# Patient Record
Sex: FEMALE | Race: WHITE | Employment: UNEMPLOYED | URBAN - METROPOLITAN AREA
[De-identification: names, ages, dates, MRNs, and addresses within clinical notes are randomized per-mention and may not be internally consistent; named-entity substitution may affect disease eponyms.]

---

## 2017-01-06 ENCOUNTER — HOSPITAL ENCOUNTER (OUTPATIENT)
Dept: PHARMACY | Age: 82
Discharge: HOME | End: 2017-01-06
Payer: MEDICARE

## 2017-01-06 DIAGNOSIS — I82.91 CHRONIC EMBOLISM AND THROMBOSIS OF UNSPECIFIED VEIN: ICD-10-CM

## 2017-01-06 LAB
INR BLD: 3.2
PROTIME: 39 SECONDS

## 2017-01-06 PROCEDURE — 85610 PROTHROMBIN TIME: CPT | Performed by: PHARMACIST

## 2017-01-06 PROCEDURE — G0463 HOSPITAL OUTPT CLINIC VISIT: HCPCS | Performed by: PHARMACIST

## 2017-02-17 ENCOUNTER — HOSPITAL ENCOUNTER (OUTPATIENT)
Dept: PHARMACY | Age: 82
Discharge: HOME | End: 2017-02-17
Payer: MEDICARE

## 2017-02-17 DIAGNOSIS — I82.91 CHRONIC EMBOLISM AND THROMBOSIS OF UNSPECIFIED VEIN: ICD-10-CM

## 2017-02-17 LAB
INR BLD: 2.6
PROTIME: 30.7 SECONDS

## 2017-02-17 PROCEDURE — G0463 HOSPITAL OUTPT CLINIC VISIT: HCPCS

## 2017-02-17 PROCEDURE — 85610 PROTHROMBIN TIME: CPT

## 2017-03-31 ENCOUNTER — HOSPITAL ENCOUNTER (OUTPATIENT)
Dept: PHARMACY | Age: 82
Discharge: HOME | End: 2017-03-31
Payer: MEDICARE

## 2017-03-31 DIAGNOSIS — I82.91 CHRONIC EMBOLISM AND THROMBOSIS OF UNSPECIFIED VEIN: ICD-10-CM

## 2017-03-31 LAB
INR BLD: 3
PROTIME: 35.7 SECONDS

## 2017-03-31 PROCEDURE — 85610 PROTHROMBIN TIME: CPT

## 2017-03-31 PROCEDURE — G0463 HOSPITAL OUTPT CLINIC VISIT: HCPCS

## 2017-05-12 ENCOUNTER — HOSPITAL ENCOUNTER (OUTPATIENT)
Dept: PHARMACY | Age: 82
Discharge: HOME | End: 2017-05-12
Payer: MEDICARE

## 2017-05-12 DIAGNOSIS — I82.91 CHRONIC EMBOLISM AND THROMBOSIS OF UNSPECIFIED VEIN: ICD-10-CM

## 2017-05-12 LAB
INR BLD: 2.9
PROTIME: 34.5 SECONDS

## 2017-05-12 PROCEDURE — 99211 OFF/OP EST MAY X REQ PHY/QHP: CPT

## 2017-05-12 PROCEDURE — 85610 PROTHROMBIN TIME: CPT

## 2017-06-22 ENCOUNTER — HOSPITAL ENCOUNTER (OUTPATIENT)
Dept: PHARMACY | Age: 82
Setting detail: THERAPIES SERIES
Discharge: HOME | End: 2017-06-22
Payer: MEDICARE

## 2017-06-22 DIAGNOSIS — I82.91 CHRONIC EMBOLISM AND THROMBOSIS OF UNSPECIFIED VEIN: ICD-10-CM

## 2017-06-22 LAB
INR BLD: 1.9
PROTIME: 23.1 SECONDS

## 2017-06-22 PROCEDURE — 85610 PROTHROMBIN TIME: CPT

## 2017-06-22 PROCEDURE — 99211 OFF/OP EST MAY X REQ PHY/QHP: CPT

## 2017-08-03 ENCOUNTER — HOSPITAL ENCOUNTER (OUTPATIENT)
Dept: PHARMACY | Age: 82
Setting detail: THERAPIES SERIES
Discharge: HOME | End: 2017-08-03
Payer: MEDICARE

## 2017-08-03 DIAGNOSIS — I82.91 CHRONIC EMBOLISM AND THROMBOSIS OF UNSPECIFIED VEIN: ICD-10-CM

## 2017-08-03 LAB
INR BLD: 2.9
PROTIME: 34.8 SECONDS

## 2017-08-03 PROCEDURE — 85610 PROTHROMBIN TIME: CPT | Performed by: PHARMACIST

## 2017-08-03 PROCEDURE — 99211 OFF/OP EST MAY X REQ PHY/QHP: CPT | Performed by: PHARMACIST

## 2017-08-22 ENCOUNTER — HOSPITAL ENCOUNTER (OUTPATIENT)
Dept: PHARMACY | Age: 82
Setting detail: THERAPIES SERIES
Discharge: HOME | End: 2017-08-22
Payer: MEDICARE

## 2017-08-22 DIAGNOSIS — I82.91 CHRONIC EMBOLISM AND THROMBOSIS OF UNSPECIFIED VEIN: ICD-10-CM

## 2017-08-22 LAB
INR BLD: 4.1
PROTIME: 49.5 SECONDS

## 2017-08-22 PROCEDURE — 85610 PROTHROMBIN TIME: CPT

## 2017-08-22 PROCEDURE — 99211 OFF/OP EST MAY X REQ PHY/QHP: CPT

## 2017-09-12 ENCOUNTER — TELEPHONE (OUTPATIENT)
Dept: PHARMACY | Age: 82
End: 2017-09-12

## 2017-09-12 ENCOUNTER — HOSPITAL ENCOUNTER (OUTPATIENT)
Dept: PHARMACY | Age: 82
Setting detail: THERAPIES SERIES
Discharge: HOME | End: 2017-09-12
Payer: MEDICARE

## 2017-09-12 DIAGNOSIS — R63.4 WEIGHT LOSS: ICD-10-CM

## 2017-09-12 DIAGNOSIS — I82.91 CHRONIC EMBOLISM AND THROMBOSIS OF UNSPECIFIED VEIN: ICD-10-CM

## 2017-09-12 DIAGNOSIS — L65.9 ALOPECIA: ICD-10-CM

## 2017-09-12 DIAGNOSIS — E05.90 HYPERTHYROIDISM: ICD-10-CM

## 2017-09-12 LAB
INR BLD: 3.2
PROTIME: 38.6 SECONDS

## 2017-09-12 PROCEDURE — 99211 OFF/OP EST MAY X REQ PHY/QHP: CPT

## 2017-09-12 PROCEDURE — 85610 PROTHROMBIN TIME: CPT

## 2017-09-12 RX ORDER — WARFARIN SODIUM 2.5 MG/1
TABLET ORAL
Qty: 100 TABLET | Refills: 1 | Status: SHIPPED | OUTPATIENT
Start: 2017-09-12

## 2017-09-26 ENCOUNTER — HOSPITAL ENCOUNTER (OUTPATIENT)
Dept: PHARMACY | Age: 82
Setting detail: THERAPIES SERIES
Discharge: HOME | End: 2017-09-26
Payer: MEDICARE

## 2017-09-26 DIAGNOSIS — I82.91 CHRONIC EMBOLISM AND THROMBOSIS OF UNSPECIFIED VEIN: ICD-10-CM

## 2017-09-26 LAB
INR BLD: 3.2
PROTIME: 38.9 SECONDS

## 2017-09-26 PROCEDURE — 85610 PROTHROMBIN TIME: CPT | Performed by: PHARMACIST

## 2017-09-26 PROCEDURE — 99211 OFF/OP EST MAY X REQ PHY/QHP: CPT | Performed by: PHARMACIST

## 2017-10-11 ENCOUNTER — HOSPITAL ENCOUNTER (OUTPATIENT)
Dept: PHARMACY | Age: 82
Setting detail: THERAPIES SERIES
Discharge: HOME | End: 2017-10-11
Payer: MEDICARE

## 2017-10-11 DIAGNOSIS — I82.91 CHRONIC EMBOLISM AND THROMBOSIS OF UNSPECIFIED VEIN: ICD-10-CM

## 2017-10-11 LAB
INR BLD: 1.6
PROTIME: 18.8 SECONDS

## 2017-10-11 PROCEDURE — 99211 OFF/OP EST MAY X REQ PHY/QHP: CPT | Performed by: PHARMACIST

## 2017-10-11 PROCEDURE — 85610 PROTHROMBIN TIME: CPT | Performed by: PHARMACIST

## 2017-10-11 NOTE — PROGRESS NOTES
Ms. Hue Urban is a 80 y.o. y/o female with history of chronic embolism and thrombosis who presents today for anticoagulation monitoring and adjustment. INR 1.6 is sub-therapeutic for this patient (goal range 2-3) and is reflective of 16.25 mg TWD  Patient verifies current dosing regimen, patient able to verbally recall dose  Patient reports no  missed doses since last INR   Patient denies s/sx clotting and/or stroke  Patient denies hematuria, epistaxis, rectal bleeding  Patient denies changes in diet, alcohol, or tobacco use  Reviewed medication list and drug allergies with patient, updated any medication additions or modifications accordingly  Patient also denies any pending medical or dental procedures scheduled at this time  Patient was instructed to take full tablet (2.5 mg Dose) today as booster then continue 16.25 mg TWD and RTC 2 weeks  Reviewed use of Spiriva Respimat with patient due to patient's unfamiliarity.  Watched online video demo by company

## 2017-10-25 ENCOUNTER — HOSPITAL ENCOUNTER (OUTPATIENT)
Dept: PHARMACY | Age: 82
Setting detail: THERAPIES SERIES
Discharge: HOME | End: 2017-10-25
Payer: MEDICARE

## 2017-10-25 DIAGNOSIS — I82.91 CHRONIC EMBOLISM AND THROMBOSIS OF UNSPECIFIED VEIN: ICD-10-CM

## 2017-10-25 LAB
INR BLD: 2.2
PROTIME: 26.3 SECONDS

## 2017-10-25 PROCEDURE — 85610 PROTHROMBIN TIME: CPT

## 2017-10-25 PROCEDURE — 99211 OFF/OP EST MAY X REQ PHY/QHP: CPT

## 2017-11-15 ENCOUNTER — HOSPITAL ENCOUNTER (OUTPATIENT)
Dept: PHARMACY | Age: 82
Setting detail: THERAPIES SERIES
Discharge: HOME | End: 2017-11-15
Payer: MEDICARE

## 2017-11-15 DIAGNOSIS — I82.91 CHRONIC EMBOLISM AND THROMBOSIS OF UNSPECIFIED VEIN: ICD-10-CM

## 2017-11-15 LAB
INR BLD: 1.9
PROTIME: 22.8 SECONDS

## 2017-11-15 PROCEDURE — 85610 PROTHROMBIN TIME: CPT

## 2017-11-15 PROCEDURE — 99211 OFF/OP EST MAY X REQ PHY/QHP: CPT

## 2017-11-15 NOTE — PROGRESS NOTES
Ms. Dajuan England is a 80 y.o. y/o female with history of DVT who presents today for anticoagulation monitoring and adjustment.   INR 1.9 is slightly subtherapeutic for this patient (goal range 2-3) and is reflective of 16.25 mg TWD  Patient verifies current dosing regimen, patient able to verbally recall dose  Patient reports no  missed doses since last INR   Patient denies s/sx clotting and/or stroke  Patient denies hematuria, epistaxis, rectal bleeding  Patient denies changes in diet, alcohol, or tobacco use  Reviewed medication list and drug allergies with patient, updated any medication additions or modifications accordingly  Patient also denies any pending medical or dental procedures scheduled at this time  Patient was instructed to take 2.5 mg today then resume previous regimen of 16.25 mg TWD and RTC 4 weeks per patient request (previous 4-5 weeker)    Lawson FletcherD  Staff Pharmacist  11/15/2017 10:29 AM

## 2017-12-12 ENCOUNTER — TELEPHONE (OUTPATIENT)
Dept: PHARMACY | Age: 82
End: 2017-12-12

## 2017-12-12 NOTE — TELEPHONE ENCOUNTER
Pt called to let us know she will not be in tomorrow.  Pt is currently in hospital.    [address] [facility]  Staff Pharmacist  12/12/2017  1:49 PM

## 2017-12-13 ENCOUNTER — APPOINTMENT (OUTPATIENT)
Dept: PHARMACY | Age: 82
End: 2017-12-13
Payer: MEDICARE

## 2017-12-20 ENCOUNTER — TELEPHONE (OUTPATIENT)
Dept: PHARMACY | Age: 82
End: 2017-12-20

## 2017-12-26 ENCOUNTER — HOSPITAL ENCOUNTER (OUTPATIENT)
Dept: PHARMACY | Age: 82
Setting detail: THERAPIES SERIES
Discharge: HOME | End: 2017-12-26
Payer: MEDICARE

## 2017-12-26 DIAGNOSIS — I82.91 CHRONIC EMBOLISM AND THROMBOSIS OF UNSPECIFIED VEIN: ICD-10-CM

## 2017-12-26 LAB
INR BLD: 2.6
PROTIME: 31 SECONDS

## 2017-12-26 PROCEDURE — 85610 PROTHROMBIN TIME: CPT

## 2017-12-26 PROCEDURE — 99211 OFF/OP EST MAY X REQ PHY/QHP: CPT

## 2018-01-23 ENCOUNTER — HOSPITAL ENCOUNTER (OUTPATIENT)
Dept: PHARMACY | Age: 83
Setting detail: THERAPIES SERIES
Discharge: HOME | End: 2018-01-23
Payer: MEDICARE

## 2018-01-23 DIAGNOSIS — I82.91 CHRONIC EMBOLISM AND THROMBOSIS OF UNSPECIFIED VEIN: ICD-10-CM

## 2018-01-23 LAB
INR BLD: 2.2
PROTIME: 26 SECONDS

## 2018-01-23 PROCEDURE — 99211 OFF/OP EST MAY X REQ PHY/QHP: CPT

## 2018-01-23 PROCEDURE — 85610 PROTHROMBIN TIME: CPT

## 2018-02-27 ENCOUNTER — HOSPITAL ENCOUNTER (OUTPATIENT)
Dept: PHARMACY | Age: 83
Setting detail: THERAPIES SERIES
Discharge: HOME | End: 2018-02-27
Payer: MEDICARE

## 2018-02-27 DIAGNOSIS — I82.91 CHRONIC EMBOLISM AND THROMBOSIS OF UNSPECIFIED VEIN: ICD-10-CM

## 2018-02-27 LAB
INR BLD: 2.1
PROTIME: 24.9 SECONDS

## 2018-02-27 PROCEDURE — 85610 PROTHROMBIN TIME: CPT | Performed by: PHARMACIST

## 2018-02-27 PROCEDURE — 99211 OFF/OP EST MAY X REQ PHY/QHP: CPT | Performed by: PHARMACIST

## 2018-04-03 ENCOUNTER — HOSPITAL ENCOUNTER (OUTPATIENT)
Dept: PHARMACY | Age: 83
Setting detail: THERAPIES SERIES
Discharge: HOME | End: 2018-04-03
Payer: MEDICARE

## 2018-04-03 DIAGNOSIS — I82.91 CHRONIC EMBOLISM AND THROMBOSIS OF UNSPECIFIED VEIN: ICD-10-CM

## 2018-04-03 LAB
INR BLD: 2.5
PROTIME: 30.4 SECONDS

## 2018-04-03 PROCEDURE — 85610 PROTHROMBIN TIME: CPT

## 2018-04-03 PROCEDURE — 99211 OFF/OP EST MAY X REQ PHY/QHP: CPT

## 2018-05-15 ENCOUNTER — HOSPITAL ENCOUNTER (OUTPATIENT)
Dept: PHARMACY | Age: 83
Setting detail: THERAPIES SERIES
Discharge: HOME | End: 2018-05-15
Payer: MEDICARE

## 2018-05-15 DIAGNOSIS — I82.91 CHRONIC EMBOLISM AND THROMBOSIS OF UNSPECIFIED VEIN: ICD-10-CM

## 2018-05-15 LAB
INR BLD: 2.3
PROTIME: 27.1 SECONDS

## 2018-05-15 PROCEDURE — 99211 OFF/OP EST MAY X REQ PHY/QHP: CPT | Performed by: PHARMACIST

## 2018-05-15 PROCEDURE — 85610 PROTHROMBIN TIME: CPT | Performed by: PHARMACIST

## 2018-06-18 ENCOUNTER — TELEPHONE (OUTPATIENT)
Dept: PHARMACY | Age: 83
End: 2018-06-18

## 2018-06-18 DIAGNOSIS — I82.91 CHRONIC EMBOLISM AND THROMBOSIS OF UNSPECIFIED VEIN: ICD-10-CM

## 2018-06-20 ENCOUNTER — HOSPITAL ENCOUNTER (OUTPATIENT)
Dept: PHARMACY | Age: 83
Setting detail: THERAPIES SERIES
Discharge: HOME | End: 2018-06-20
Payer: MEDICARE

## 2018-06-20 DIAGNOSIS — I82.91 CHRONIC EMBOLISM AND THROMBOSIS OF UNSPECIFIED VEIN: ICD-10-CM

## 2018-06-20 LAB
INR BLD: 3.2
PROTIME: 38.1 SECONDS

## 2018-06-20 PROCEDURE — 99211 OFF/OP EST MAY X REQ PHY/QHP: CPT

## 2018-06-20 PROCEDURE — 85610 PROTHROMBIN TIME: CPT

## 2018-07-18 ENCOUNTER — HOSPITAL ENCOUNTER (OUTPATIENT)
Dept: PHARMACY | Age: 83
Setting detail: THERAPIES SERIES
Discharge: HOME | End: 2018-07-18
Payer: MEDICARE

## 2018-07-18 DIAGNOSIS — I82.91 CHRONIC EMBOLISM AND THROMBOSIS OF UNSPECIFIED VEIN: ICD-10-CM

## 2018-07-18 LAB
INR BLD: 2.6
PROTIME: 31.3 SECONDS

## 2018-07-18 PROCEDURE — 99211 OFF/OP EST MAY X REQ PHY/QHP: CPT

## 2018-07-18 PROCEDURE — 85610 PROTHROMBIN TIME: CPT

## 2018-07-18 NOTE — PROGRESS NOTES
Ms. Vadim Marc is a 80 y.o. y/o female with history of chronic embolism and thrombosis of unspecified vein who presents today for anticoagulation monitoring and adjustment.   INR 2.6 is therapeutic for this patient (goal range 2-3) and is reflective of 16.25 mg TWD  Patient verifies current dosing regimen, patient able to verbally recall dose  Patient reports 0  missed doses since last INR   Patient denies s/sx clotting and/or stroke  Patient denies hematuria, epistaxis, rectal bleeding  Patient denies changes in diet, alcohol, or tobacco use  Reviewed medication list and drug allergies with patient, updated any medication additions or modifications accordingly  Patient also denies any pending medical or dental procedures scheduled at this time  Patient was instructed to continue 16.25 mg TWD and RTC 4 weeks

## 2018-08-15 ENCOUNTER — HOSPITAL ENCOUNTER (OUTPATIENT)
Dept: PHARMACY | Age: 83
Setting detail: THERAPIES SERIES
Discharge: HOME | End: 2018-08-15
Payer: MEDICARE

## 2018-08-15 DIAGNOSIS — I82.91 CHRONIC EMBOLISM AND THROMBOSIS OF UNSPECIFIED VEIN: ICD-10-CM

## 2018-08-15 LAB
INR BLD: 2.4
PROTIME: 29.1 SECONDS

## 2018-08-15 PROCEDURE — 99211 OFF/OP EST MAY X REQ PHY/QHP: CPT

## 2018-08-15 PROCEDURE — 85610 PROTHROMBIN TIME: CPT

## 2018-09-26 ENCOUNTER — HOSPITAL ENCOUNTER (OUTPATIENT)
Dept: PHARMACY | Age: 83
Setting detail: THERAPIES SERIES
Discharge: HOME | End: 2018-09-26
Payer: MEDICARE

## 2018-09-26 LAB
INR BLD: 3.9
PROTIME: 46.4 SECONDS

## 2018-09-26 PROCEDURE — 85610 PROTHROMBIN TIME: CPT

## 2018-09-26 PROCEDURE — 99211 OFF/OP EST MAY X REQ PHY/QHP: CPT

## 2018-10-24 ENCOUNTER — HOSPITAL ENCOUNTER (OUTPATIENT)
Dept: PHARMACY | Age: 83
Setting detail: THERAPIES SERIES
Discharge: HOME | End: 2018-10-24
Payer: MEDICARE

## 2018-10-24 DIAGNOSIS — I82.91 CHRONIC EMBOLISM AND THROMBOSIS OF UNSPECIFIED VEIN: ICD-10-CM

## 2018-10-24 PROCEDURE — 85610 PROTHROMBIN TIME: CPT | Performed by: PHARMACIST

## 2018-10-24 PROCEDURE — 99211 OFF/OP EST MAY X REQ PHY/QHP: CPT | Performed by: PHARMACIST

## 2018-10-24 NOTE — PROGRESS NOTES
Ms. Israel Landaverde is a 80 y.o. y/o female with history of Chronic embolism and thrombosis of unspecified vein  who presents today for anticoagulation monitoring and adjustment.   INR 2.4 is therapeutic for this patient (goal range 2-3) and is reflective of 16.25 mg TWD  Patient verifies current dosing regimen, patient able to verbally recall dose  Patient reports no missed doses since last INR   Patient denies s/sx clotting and/or stroke  Patient denies hematuria, epistaxis, rectal bleeding  Patient denies changes in diet, alcohol, or tobacco use  Reviewed medication list and drug allergies with patient, updated any medication additions or modifications accordingly  Patient also denies any pending medical or dental procedures scheduled at this time  Patient was instructed to continue 16.25 mg TWD and RTC 4 weeks

## 2018-11-21 ENCOUNTER — HOSPITAL ENCOUNTER (OUTPATIENT)
Dept: PHARMACY | Age: 83
Setting detail: THERAPIES SERIES
Discharge: HOME | End: 2018-11-21
Payer: MEDICARE

## 2018-11-21 DIAGNOSIS — I82.91 CHRONIC EMBOLISM AND THROMBOSIS OF UNSPECIFIED VEIN: ICD-10-CM

## 2018-11-21 PROCEDURE — 99211 OFF/OP EST MAY X REQ PHY/QHP: CPT

## 2018-11-21 PROCEDURE — 85610 PROTHROMBIN TIME: CPT

## 2018-12-05 ENCOUNTER — HOSPITAL ENCOUNTER (OUTPATIENT)
Dept: PHARMACY | Age: 83
Setting detail: THERAPIES SERIES
Discharge: HOME | End: 2018-12-05
Payer: MEDICARE

## 2018-12-05 DIAGNOSIS — I82.91 CHRONIC EMBOLISM AND THROMBOSIS OF UNSPECIFIED VEIN: ICD-10-CM

## 2018-12-05 LAB — INTERNATIONAL NORMALIZATION RATIO, POC: 2.4

## 2018-12-05 PROCEDURE — 99211 OFF/OP EST MAY X REQ PHY/QHP: CPT | Performed by: PHARMACIST

## 2018-12-05 PROCEDURE — 85610 PROTHROMBIN TIME: CPT | Performed by: PHARMACIST

## 2018-12-26 ENCOUNTER — APPOINTMENT (OUTPATIENT)
Dept: PHARMACY | Age: 83
End: 2018-12-26
Payer: MEDICARE

## 2018-12-27 ENCOUNTER — ANTI-COAG VISIT (OUTPATIENT)
Dept: PHARMACY | Age: 83
End: 2018-12-27

## 2018-12-27 DIAGNOSIS — I82.91 CHRONIC EMBOLISM AND THROMBOSIS OF UNSPECIFIED VEIN: ICD-10-CM

## 2020-06-26 PROBLEM — Z79.01 PULMONARY EMBOLISM ON LONG-TERM ANTICOAGULATION THERAPY (HCC): Status: ACTIVE | Noted: 2020-06-26

## 2020-06-26 PROBLEM — I10 ESSENTIAL HYPERTENSION: Status: ACTIVE | Noted: 2017-02-20

## 2020-06-26 PROBLEM — I26.99 PULMONARY EMBOLISM ON LONG-TERM ANTICOAGULATION THERAPY (HCC): Status: ACTIVE | Noted: 2020-06-26

## 2020-06-26 PROBLEM — N17.9 AKI (ACUTE KIDNEY INJURY) (HCC): Status: ACTIVE | Noted: 2017-02-20

## 2020-06-26 PROBLEM — C73 THYROID CANCER (HCC): Status: ACTIVE | Noted: 2018-09-04

## 2020-06-26 PROBLEM — H35.3132 NONEXUDATIVE AGE-RELATED MACULAR DEGENERATION, BILATERAL, INTERMEDIATE DRY STAGE: Status: ACTIVE | Noted: 2017-12-21

## 2020-06-26 PROBLEM — R91.8 PULMONARY NODULES: Status: ACTIVE | Noted: 2017-10-10

## 2020-06-26 PROBLEM — Z96.1 PSEUDOPHAKIA OF BOTH EYES: Status: ACTIVE | Noted: 2017-04-26

## 2020-06-26 PROBLEM — Z79.01 CURRENT USE OF LONG TERM ANTICOAGULATION: Status: ACTIVE | Noted: 2017-02-20

## 2020-06-26 PROBLEM — G45.9 TIA (TRANSIENT ISCHEMIC ATTACK): Status: ACTIVE | Noted: 2017-11-17

## 2020-06-26 PROBLEM — I80.9 PHLEBITIS AND THROMBOPHLEBITIS OF UNSPECIFIED SITE: Status: ACTIVE | Noted: 2020-06-26

## 2020-07-01 ENCOUNTER — OFFICE VISIT (OUTPATIENT)
Dept: NEUROLOGY | Age: 85
End: 2020-07-01
Payer: MEDICARE

## 2020-07-01 VITALS
SYSTOLIC BLOOD PRESSURE: 81 MMHG | DIASTOLIC BLOOD PRESSURE: 60 MMHG | BODY MASS INDEX: 25.63 KG/M2 | HEART RATE: 62 BPM | WEIGHT: 154 LBS

## 2020-07-01 PROCEDURE — G8427 DOCREV CUR MEDS BY ELIG CLIN: HCPCS | Performed by: NURSE PRACTITIONER

## 2020-07-01 PROCEDURE — 4040F PNEUMOC VAC/ADMIN/RCVD: CPT | Performed by: NURSE PRACTITIONER

## 2020-07-01 PROCEDURE — 99205 OFFICE O/P NEW HI 60 MIN: CPT | Performed by: NURSE PRACTITIONER

## 2020-07-01 PROCEDURE — 1090F PRES/ABSN URINE INCON ASSESS: CPT | Performed by: NURSE PRACTITIONER

## 2020-07-01 PROCEDURE — 4004F PT TOBACCO SCREEN RCVD TLK: CPT | Performed by: NURSE PRACTITIONER

## 2020-07-01 PROCEDURE — G8419 CALC BMI OUT NRM PARAM NOF/U: HCPCS | Performed by: NURSE PRACTITIONER

## 2020-07-01 PROCEDURE — 1123F ACP DISCUSS/DSCN MKR DOCD: CPT | Performed by: NURSE PRACTITIONER

## 2020-07-01 RX ORDER — PREDNISOLONE ACETATE 10 MG/ML
1 SUSPENSION/ DROPS OPHTHALMIC DAILY
COMMUNITY
Start: 2019-08-15 | End: 2020-07-01

## 2020-07-01 RX ORDER — PROPRANOLOL HYDROCHLORIDE 40 MG/1
20 TABLET ORAL 2 TIMES DAILY
COMMUNITY
Start: 2020-03-03

## 2020-07-01 RX ORDER — MIDODRINE HYDROCHLORIDE 2.5 MG/1
2.5 TABLET ORAL 3 TIMES DAILY
Qty: 90 TABLET | Refills: 3 | Status: SHIPPED | OUTPATIENT
Start: 2020-07-01 | End: 2021-04-12

## 2020-07-01 RX ORDER — LEVOTHYROXINE SODIUM 0.07 MG/1
75 TABLET ORAL DAILY
COMMUNITY
Start: 2020-03-03

## 2020-07-01 ASSESSMENT — ENCOUNTER SYMPTOMS
CONSTIPATION: 0
BACK PAIN: 1
COUGH: 0
ABDOMINAL PAIN: 0
DIARRHEA: 0
VOMITING: 0
WHEEZING: 0
COLOR CHANGE: 0
NAUSEA: 0
TROUBLE SWALLOWING: 1
SHORTNESS OF BREATH: 1
ABDOMINAL DISTENTION: 0
CHEST TIGHTNESS: 0

## 2020-07-01 NOTE — PROGRESS NOTES
Pt states sometimes just doesn't rememer why she walked iinto a room or appointments and short term memory is getting worse she cant remember things she was just talkng about and some times loses her words she will now what she wants to say but it gets \"jumbled up\" as she tries to say it.  Patient has fallen about 6 times she doesn't use  Her walker around the house and the neighbor edilson comes and helps her up

## 2020-07-01 NOTE — PROGRESS NOTES
bradykinesia. She uses walker to ambulate and gait is unsteady at times. Patient reports 6 falls over the last 6 months. She describes them as mechanical in nature but her history of events is unclear. She states that she has hit her head several times with fall. She reports she had a recent CT of the brain done approximately 2 to 3 weeks ago. No results available for review today. She denies any urinary incontinence, headaches, diplopia, dysarthria, dysphasia, one-sided weakness, chest pain, palpitations. She does have occasional shortness of breath. Does not wear oxygen. Currently alert and oriented x3, no acute distress, cooperative. No focal deficits.   MMSE score 28 out of 30  Past Medical History:   Diagnosis Date    [facility]chronic obstructive pulmonary disease) ([facility] [address]     Current use of long term anticoagulation     Diabetes mellitus ([facility] [address]     Dysphagia     Follicular thyroid cancer ([facility] [address]     HLD (hyperlipidemia)     HTN (hypertension)     PE (pulmonary thromboembolism) (HCC)     Pulmonary nodules     Vocal cord paralysis      Past Surgical History:   Procedure Laterality Date    THYROIDECTOMY       Social History     Socioeconomic History    Marital status:      Spouse name: Not on file    Number of children: Not on file    Years of education: Not on file    Highest education level: Not on file   Occupational History    Not on file   Social Needs    Financial resource strain: Not on file    Food insecurity     Worry: Not on file     Inability: Not on file    Transportation needs     Medical: Not on file     Non-medical: Not on file   Tobacco Use    Smoking status: Not on file   Substance and Sexual Activity    Alcohol use: Not on file    Drug use: Not on file    Sexual activity: Not on file   Lifestyle    Physical activity     Days per week: Not on file     Minutes per session: Not on file    Stress: Not on file   Relationships    Social connections     Talks on phone: Not on file     Gets together: Not on file     Attends [employer] service: Not on file     Active member of club or organization: Not on file     Attends meetings of clubs or organizations: Not on file     Relationship status: Not on file    Intimate partner violence     Fear of current or ex partner: Not on file     Emotionally abused: Not on file     Physically abused: Not on file     Forced sexual activity: Not on file   Other Topics Concern    Not on file   Social History Narrative    Not on file     No family history on file. Allergies   Allergen Reactions    Ambien [Zolpidem Tartrate] Other (See Comments)     Makes her \"crazy\"     Current Outpatient Medications on File Prior to Visit   Medication Sig Dispense Refill    levothyroxine (SYNTHROID) 75 MCG tablet Take 75 mcg by mouth daily      propranolol (INDERAL) 40 MG tablet Take 20 mg by mouth 2 times daily      warfarin (COUMADIN) 2.5 MG tablet Take as directed by [facility]. Quantity equals 90 day supply. 100 tablet 1    warfarin (COUMADIN) 5 MG tablet TAKE 1 TABLET DAILY (Patient not taking: Reported on 7/1/2020) 90 tablet 2    glimepiride (AMARYL) 2 MG tablet Take 2 mg by mouth every morning (before breakfast).  simvastatin (ZOCOR) 40 MG tablet Take 40 mg by mouth nightly.  oxycodone-acetaminophen (PERCOCET) 5-325 MG per tablet Take 1 tablet by mouth every 4 hours as needed. No current facility-administered medications on file prior to visit. Review of Systems   Constitutional: Positive for appetite change. Negative for chills, fatigue and fever. HENT: Positive for trouble swallowing. Negative for hearing loss. Eyes: Negative for visual disturbance. Respiratory: Positive for shortness of breath. Negative for cough, chest tightness and wheezing. Cardiovascular: Negative for chest pain, palpitations and leg swelling.    Gastrointestinal: Negative for abdominal distention, abdominal pain, constipation, diarrhea, nausea and vomiting. Genitourinary: Negative for difficulty urinating. Musculoskeletal: Positive for back pain and gait problem. Skin: Negative for color change and rash. Neurological: Positive for dizziness and weakness. Negative for tremors, seizures, syncope, facial asymmetry, speech difficulty, light-headedness, numbness and headaches. Psychiatric/Behavioral: Positive for confusion (  Forgetful). Negative for agitation and hallucinations. The patient is not nervous/anxious. Objective:   BP 81/60 (Site: Left Upper Arm, Position: Standing, Cuff Size: Large Adult)   Pulse 62   Wt 154 lb (69.9 kg)   BMI 25.63 kg/m²     Physical Exam  Vitals signs reviewed. Constitutional:       General: She is not in acute distress. Appearance: She is not ill-appearing or diaphoretic. HENT:      Head: Normocephalic and atraumatic. Eyes:      General: No visual field deficit. Extraocular Movements: Extraocular movements intact. Pupils: Pupils are equal, round, and reactive to light. Cardiovascular:      Rate and Rhythm: Normal rate and regular rhythm. Pulmonary:      Effort: Pulmonary effort is normal. No respiratory distress. Breath sounds: Normal breath sounds. Abdominal:      General: Bowel sounds are normal. There is no distension. Palpations: Abdomen is soft. Tenderness: There is no abdominal tenderness. Skin:     General: Skin is warm and dry. Neurological:      General: No focal deficit present. Mental Status: She is alert and oriented to person, place, and time. Cranial Nerves: No cranial nerve deficit, dysarthria or facial asymmetry. Motor: No weakness, tremor or seizure activity. Coordination: Romberg sign negative. Coordination normal. Finger-Nose-Finger Test normal.      Gait: Gait abnormal.         No results found.     Lab Results   Component Value Date    WBC 7.9 08/16/2012    RBC 4.23 08/16/2012    RBC 3.15 06/06/2012    HGB 12.6 08/16/2012    HCT 37.2 08/16/2012    MCV 87.9 08/16/2012    MCH 29.9 08/16/2012    MCHC 34.0 08/16/2012    RDW 18.0 08/16/2012     08/16/2012    MPV 7.4 08/16/2012     Lab Results   Component Value Date     08/16/2012    K 4.4 08/16/2012     08/16/2012    CO2 30 08/16/2012    BUN 23 08/16/2012    CREATININE 0.89 08/16/2012    GFRAA 74.0 08/16/2012    LABGLOM 61.2 08/16/2012    GLUCOSE 122 08/17/2012    GLUCOSE 90 05/25/2012    PROT 6.4 03/22/2012    LABALBU 4.0 03/22/2012    CALCIUM 8.7 08/16/2012    ALKPHOS 99 03/22/2012    AST 25 03/22/2012    ALT 21 03/22/2012     Lab Results   Component Value Date    PROTIME 46.4 09/26/2018    PROTIME 28.1 09/29/2016    INR 2.4 12/05/2018    INR 3.9 09/26/2018     Lab Results   Component Value Date    TSH 21.143 08/16/2012     Lab Results   Component Value Date    TRIG 246 03/22/2012    HDL 43 03/22/2012    LDLCALC 39 03/22/2012     No results found for: Edwin Moeller, LABBENZ, CANNAB, COCAINESCRN, LABMETH, OPIATESCREENURINE, PHENCYCLIDINESCREENURINE, PPXUR, ETOH  No results found for: LITHIUM, DILFRTOT, VALPROATE    Assessment and Plan:      1. Mild cognitive impairment  -MMSE 28 out of 30  -CBC 5/11/2020 within normal limits  -CMP 5/11/2020 noncontributory  -TSH 5/11/2020 less than 0.005 with a thyroglobulin of 7854  -Hemoglobin A1c 5/20/20 5.3  - Vitamin B12 & Folate; Future  -Patient with multiple risk factors for CVD. Possible she has beginnings of vascular dementia. Thyroid issues could be contributing to her cognitive impairment as well. Will obtain results of CT of head for review. We did discuss medications for cognitive impairment. At this point her impairment is mild and we will monitor moving forward. 2. Falls  -Patient with significant orthostatic hypotension. Will initiate midodrine 2.5 mg 3 times daily. Recommended CARITO escobare.   Patient is to monitor baseline blood pressure daily and call in 1 week with update. Had long discussion with patient regarding safety and need to use her walker on a consistent basis. Discussed potential for life-threatening bleeding with a fall due to Coumadin therapy. 3. Thyroid cancer ([facility] [address]  -Maintain follow-up with endocrinology    4. Snoring  - Baseline Diagnostic Sleep Study; Future    5. Orthostatic hypotension  - midodrine (PROAMATINE) 2.5 MG tablet; Take 1 tablet by mouth 3 times daily  Dispense: 90 tablet; Refill: 3  - CARITO hose    6. Encounter to establish care        Return in about 3 months (around 10/1/2020), or if symptoms worsen or fail to improve.     Nathalie Denton, DEMARCO - CNP

## 2020-07-09 LAB
FOLATE: 14.6
VITAMIN B-12: 318

## 2020-09-21 PROBLEM — R06.02 SHORTNESS OF BREATH: Status: ACTIVE | Noted: 2018-12-21

## 2020-09-21 PROBLEM — N20.0 NEPHROLITHIASIS: Status: ACTIVE | Noted: 2019-07-22

## 2020-09-21 PROBLEM — E44.0 MALNUTRITION OF MODERATE DEGREE (HCC): Status: ACTIVE | Noted: 2019-07-23

## 2020-09-21 PROBLEM — H35.371 EPIRETINAL MEMBRANE, RIGHT EYE: Status: ACTIVE | Noted: 2019-08-15

## 2020-09-22 ENCOUNTER — OFFICE VISIT (OUTPATIENT)
Dept: NEUROLOGY | Age: 85
End: 2020-09-22
Payer: MEDICARE

## 2020-09-22 VITALS
SYSTOLIC BLOOD PRESSURE: 135 MMHG | WEIGHT: 149.9 LBS | BODY MASS INDEX: 24.94 KG/M2 | HEART RATE: 62 BPM | DIASTOLIC BLOOD PRESSURE: 63 MMHG

## 2020-09-22 DIAGNOSIS — G31.84 MILD COGNITIVE IMPAIRMENT: ICD-10-CM

## 2020-09-22 DIAGNOSIS — G45.9 TIA (TRANSIENT ISCHEMIC ATTACK): ICD-10-CM

## 2020-09-22 LAB
ALBUMIN SERPL-MCNC: 3.6 G/DL (ref 3.5–4.6)
ALP BLD-CCNC: 129 U/L (ref 40–130)
ALT SERPL-CCNC: 11 U/L (ref 0–33)
ANION GAP SERPL CALCULATED.3IONS-SCNC: 12 MEQ/L (ref 9–15)
AST SERPL-CCNC: 14 U/L (ref 0–35)
BILIRUB SERPL-MCNC: 0.5 MG/DL (ref 0.2–0.7)
BUN BLDV-MCNC: 31 MG/DL (ref 8–23)
CALCIUM SERPL-MCNC: 9.2 MG/DL (ref 8.5–9.9)
CHLORIDE BLD-SCNC: 108 MEQ/L (ref 95–107)
CHOLESTEROL, TOTAL: 117 MG/DL (ref 0–199)
CO2: 24 MEQ/L (ref 20–31)
CREAT SERPL-MCNC: 0.78 MG/DL (ref 0.5–0.9)
FOLATE: 16 NG/ML (ref 7.3–26.1)
GFR AFRICAN AMERICAN: >60
GFR NON-AFRICAN AMERICAN: >60
GLOBULIN: 2.4 G/DL (ref 2.3–3.5)
GLUCOSE BLD-MCNC: 121 MG/DL (ref 70–99)
HBA1C MFR BLD: 5.7 % (ref 4.8–5.9)
HCT VFR BLD CALC: 39.6 % (ref 37–47)
HDLC SERPL-MCNC: 45 MG/DL (ref 40–59)
HEMOGLOBIN: 12.9 G/DL (ref 12–16)
INR BLD: 1.5
LDL CHOLESTEROL CALCULATED: 43 MG/DL (ref 0–129)
MCH RBC QN AUTO: 28.8 PG (ref 27–31.3)
MCHC RBC AUTO-ENTMCNC: 32.6 % (ref 33–37)
MCV RBC AUTO: 88.1 FL (ref 82–100)
PDW BLD-RTO: 14.7 % (ref 11.5–14.5)
PLATELET # BLD: 194 K/UL (ref 130–400)
POTASSIUM SERPL-SCNC: 4.2 MEQ/L (ref 3.4–4.9)
PROTHROMBIN TIME: 17.8 SEC (ref 12.3–14.9)
RBC # BLD: 4.49 M/UL (ref 4.2–5.4)
SODIUM BLD-SCNC: 144 MEQ/L (ref 135–144)
TOTAL PROTEIN: 6 G/DL (ref 6.3–8)
TRIGL SERPL-MCNC: 144 MG/DL (ref 0–150)
VITAMIN B-12: 284 PG/ML (ref 232–1245)
WBC # BLD: 6.9 K/UL (ref 4.8–10.8)

## 2020-09-22 PROCEDURE — 4040F PNEUMOC VAC/ADMIN/RCVD: CPT | Performed by: NURSE PRACTITIONER

## 2020-09-22 PROCEDURE — G8420 CALC BMI NORM PARAMETERS: HCPCS | Performed by: NURSE PRACTITIONER

## 2020-09-22 PROCEDURE — 4004F PT TOBACCO SCREEN RCVD TLK: CPT | Performed by: NURSE PRACTITIONER

## 2020-09-22 PROCEDURE — 99215 OFFICE O/P EST HI 40 MIN: CPT | Performed by: NURSE PRACTITIONER

## 2020-09-22 PROCEDURE — 1090F PRES/ABSN URINE INCON ASSESS: CPT | Performed by: NURSE PRACTITIONER

## 2020-09-22 PROCEDURE — G8427 DOCREV CUR MEDS BY ELIG CLIN: HCPCS | Performed by: NURSE PRACTITIONER

## 2020-09-22 PROCEDURE — 1123F ACP DISCUSS/DSCN MKR DOCD: CPT | Performed by: NURSE PRACTITIONER

## 2020-09-22 RX ORDER — ALBUTEROL SULFATE 90 UG/1
2 AEROSOL, METERED RESPIRATORY (INHALATION) EVERY 6 HOURS PRN
COMMUNITY
Start: 2020-07-31

## 2020-09-22 RX ORDER — ASPIRIN 81 MG/1
81 TABLET, CHEWABLE ORAL DAILY
Qty: 30 TABLET | Refills: 3
Start: 2020-09-22

## 2020-09-22 ASSESSMENT — ENCOUNTER SYMPTOMS
COUGH: 0
VOMITING: 0
TROUBLE SWALLOWING: 0
COLOR CHANGE: 0
NAUSEA: 0
CHEST TIGHTNESS: 0
WHEEZING: 0
SHORTNESS OF BREATH: 0

## 2020-09-22 NOTE — PROGRESS NOTES
Subjective:      Patient ID: Yan Fernandez is a 80 y.o. female who presents today for:  Chief Complaint   Patient presents with    Follow-up     Pt states that last week she was at her sister in laws she had an episode where she was unable to talk she states that the only thing that came out was [address] .. This happened around 3:30 pm until about 9:00pm where she was able to speak clear again.  Other     Pt states that she is having a hard time with her breathing she has become shorter or breath more so lately then normal.        HPI  Pt seen and examined in the office for  3 month follow up    9/22/20:  Patient seen and examined for 3-month follow-up visit for mild cognitive impairment and orthostatic hypotension. Patient accompanied to her appointment today by her . Patient reports that approximately 1 week ago she had an episode where she could not speak that lasted approximately 6 hours. She denied any accompanying headache, double vision, facial droop, dysphasia, one-sided weakness, paresthesias, chest pain or pressure or palpitations. She was alert and aware of what was happening during the episode. There is no reports of seizure activity or loss of bowel and bladder. She denied any recent fall with head trauma. Patient actually states that she has been doing better and has not fallen since our last visit. Denies dizziness. Patient did not go to the emergency room to get evaluated for this. She is currently on Coumadin which she states is for history of pulmonary embolism but is not sure when she had her last INR checked. She is not on statin therapy or aspirin. Denies history of CVA. Currently alert and oriented x3, no acute distress, cooperative. No focal deficits appreciated. 7/1/20:   Pt seen and examined in the office to establish care for memory impairment.   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 Patient presents today accompanied by daughter for concerns of memory loss. Patient reports that her memory has been declining over the last several years this is been gradual in nature. Patient denies any history of CVA, seizure, remote head trauma, CNS infection. Patient does report some difficulty retaining new information and word finding issues that are occasional and not on a daily basis. She is still able to perform all of her own ADLs including cooking and managing finances without difficulty. Patient no longer drives because she feels \"foggy in my head\" and is concerned she might get into a car accident. Patient denies any mood changes, agitation, behavior changes, hallucinations. No recent illness. No new medications. Patient does report frequent insomnia and snoring at night and excessive daytime sleepiness. She denies anxiety or depression or excessive stress. She denies alcohol or illicit drug use. No tobacco use. Patient is without trauma or bradykinesia. She uses walker to ambulate and gait is unsteady at times. Patient reports 6 falls over the last 6 months. She describes them as mechanical in nature but her history of events is unclear. She states that she has hit her head several times with fall. She reports she had a recent CT of the brain done approximately 2 to 3 weeks ago. No results available for review today. She denies any urinary incontinence, headaches, diplopia, dysarthria, dysphasia, one-sided weakness, chest pain, palpitations. She does have occasional shortness of breath. Does not wear oxygen. Currently alert and oriented x3, no acute distress, cooperative. No focal deficits.   MMSE score 28 out of 30  Past Medical History:   Diagnosis Date    COPD (chronic obstructive pulmonary disease) (HCC)     Current use of long term anticoagulation     Diabetes mellitus ([facility] [address]     Dysphagia     Follicular thyroid cancer ([facility] [address]     HLD (hyperlipidemia)     HTN (hypertension)     PE (pulmonary thromboembolism) (HCC)     Pulmonary nodules     Vocal cord paralysis      Past Surgical History:   Procedure Laterality Date    THYROIDECTOMY       Social History     Socioeconomic History    Marital status:      Spouse name: Not on file    Number of children: Not on file    Years of education: Not on file    Highest education level: Not on file   Occupational History    Not on file   Social Needs    Financial resource strain: Not on file    Food insecurity     Worry: Not on file     Inability: Not on file    Transportation needs     Medical: Not on file     Non-medical: Not on file   Tobacco Use    Smoking status: Never Smoker    Smokeless tobacco: Never Used   Substance and Sexual Activity    Alcohol use: Not on file    Drug use: Not on file    Sexual activity: Not on file   Lifestyle    Physical activity     Days per week: Not on file     Minutes per session: Not on file    Stress: Not on file   Relationships    Social connections     Talks on phone: Not on file     Gets together: Not on file     Attends Voodoo service: Not on file     Active member of club or organization: Not on file     Attends meetings of clubs or organizations: Not on file     Relationship status: Not on file    Intimate partner violence     Fear of current or ex partner: Not on file     Emotionally abused: Not on file     Physically abused: Not on file     Forced sexual activity: Not on file   Other Topics Concern    Not on file   Social History Narrative    Not on file     No family history on file.   Allergies   Allergen Reactions    Ambien [Zolpidem Tartrate] Other (See Comments)     Makes her \"crazy\"     Current Outpatient Medications on File Prior to Visit   Medication Sig Dispense Refill    albuterol sulfate  (90 Base) MCG/ACT inhaler 2 puffs by Other route every 6 hours as needed      umeclidinium-vilanterol (ANORO ELLIPTA) 62.5-25 MCG/INH AEPB inhaler Inhale into the lungs daily      levothyroxine (SYNTHROID) 75 MCG tablet Take 75 mcg by mouth daily      propranolol (INDERAL) 40 MG tablet Take 20 mg by mouth 2 times daily      warfarin (COUMADIN) 2.5 MG tablet Take as directed by [facility]. Quantity equals 90 day supply. 100 tablet 1    simvastatin (ZOCOR) 40 MG tablet Take 40 mg by mouth nightly.  midodrine (PROAMATINE) 2.5 MG tablet Take 1 tablet by mouth 3 times daily (Patient not taking: Reported on 9/22/2020) 90 tablet 3    warfarin (COUMADIN) 5 MG tablet TAKE 1 TABLET DAILY (Patient not taking: Reported on 7/1/2020) 90 tablet 2    glimepiride (AMARYL) 2 MG tablet Take 2 mg by mouth every morning (before breakfast).  oxycodone-acetaminophen (PERCOCET) 5-325 MG per tablet Take 1 tablet by mouth every 4 hours as needed. No current facility-administered medications on file prior to visit. Review of Systems   Constitutional: Negative for appetite change, chills, fatigue and fever. HENT: Negative for hearing loss and trouble swallowing. Eyes: Negative for visual disturbance. Respiratory: Negative for cough, chest tightness, shortness of breath and wheezing. Cardiovascular: Negative for chest pain, palpitations and leg swelling. Gastrointestinal: Negative for nausea and vomiting. Genitourinary: Negative for difficulty urinating. Musculoskeletal: Positive for gait problem. Skin: Negative for color change and rash. Neurological: Positive for speech difficulty. Negative for dizziness, tremors, seizures, syncope, facial asymmetry, weakness, light-headedness, numbness and headaches. Psychiatric/Behavioral: Negative for agitation, confusion and hallucinations.  The patient is not nervous/anxious. Objective:   /63 (Site: Right Upper Arm, Position: Sitting, Cuff Size: Medium Adult)   Pulse 62   Wt 149 lb 14.4 oz (68 kg)   BMI 24.94 kg/m²     Physical Exam  Vitals signs reviewed. Constitutional:       General: She is not in acute distress. Appearance: She is not diaphoretic. HENT:      Head: Normocephalic and atraumatic. Eyes:      Extraocular Movements: Extraocular movements intact. Pupils: Pupils are equal, round, and reactive to light. Cardiovascular:      Rate and Rhythm: Normal rate and regular rhythm. Pulmonary:      Effort: Pulmonary effort is normal. No respiratory distress. Breath sounds: Normal breath sounds. Abdominal:      General: Bowel sounds are normal.      Palpations: Abdomen is soft. Skin:     General: Skin is warm and dry. Neurological:      General: No focal deficit present. Mental Status: She is alert and oriented to person, place, and time. Cranial Nerves: No cranial nerve deficit. Motor: No weakness, tremor or seizure activity. Coordination: Romberg sign negative. Finger-Nose-Finger Test normal.      Gait: Gait abnormal (  Uses walker). No results found.     Lab Results   Component Value Date    WBC 7.9 08/16/2012    RBC 4.23 08/16/2012    RBC 3.15 06/06/2012    HGB 12.6 08/16/2012    HCT 37.2 08/16/2012    MCV 87.9 08/16/2012    MCH 29.9 08/16/2012    MCHC 34.0 08/16/2012    RDW 18.0 08/16/2012     08/16/2012    MPV 7.4 08/16/2012     Lab Results   Component Value Date     08/16/2012    K 4.4 08/16/2012     08/16/2012    CO2 30 08/16/2012    BUN 23 08/16/2012    CREATININE 0.89 08/16/2012    GFRAA 74.0 08/16/2012    LABGLOM 61.2 08/16/2012    GLUCOSE 122 08/17/2012    GLUCOSE 90 05/25/2012    PROT 6.4 03/22/2012    LABALBU 4.0 03/22/2012    CALCIUM 8.7 08/16/2012    ALKPHOS 99 03/22/2012    AST 25 03/22/2012    ALT 21 03/22/2012     Lab Results   Component Value Date    PROTIME 46.4 09/26/2018    PROTIME 28.1 09/29/2016    INR 2.4 12/05/2018    INR 3.9 09/26/2018     Lab Results   Component Value Date    TSH 21.143 08/16/2012    MDDYWYDO88 318 07/08/2020    FOLATE 14.6 07/08/2020     Lab Results   Component Value Date    TRIG 246 03/22/2012    HDL 43 03/22/2012    LDLCALC 39 03/22/2012     No results found for: [employer], LABBENZ, CANNAB, COCAINESCRN, LABMETH, OPIATESCREENURINE, PHENCYCLIDINESCREENURINE, PPXUR, ETOH  No results found for: LITHIUM, DILFRTOT, VALPROATE    Assessment and Plan:      1. TIA (transient ischemic attack)  -Patient had episode approximately 1 week ago where she could not speak for 6 hours. She was alert and aware during the episode and no seizure activity was reported. She had no other associated neurological or cardiac complaints. Currently on Coumadin for which she states is for a history of pulmonary embolism. She is unsure when her last INR was checked. Not on aspirin or statin therapy currently. Will obtain MRI of the brain, carotid duplex, and echocardiogram and laboratory work-up and initiate patient on aspirin 81 mg daily. Will assess lipid panel and if appropriate initiate statin therapy. Patient was advised that if this happens again or she has any other signs and symptoms of TIA or stroke she is to go to the emergency room or call 911 immediately. - MRI BRAIN WO CONTRAST; Future  - Lipid Panel; Future  - Hemoglobin A1C; Future  - Protime-Inr; Future  - aspirin (ASPIRIN CHILDRENS) 81 MG chewable tablet; Take 1 tablet by mouth daily  Dispense: 30 tablet; Refill: 3  - ECHO 2D WO Color Doppler Complete; Future  - US CAROTID ARTERY BILATERAL; Future  - CBC; Future  - Comprehensive Metabolic Panel; Future    2. Orthostatic hypotension  -Improved  -Continue midodrine    3. Hyperlipidemia, unspecified hyperlipidemia type  -Lipid panel      Return in about 3 months (around 12/22/2020), or if symptoms worsen or fail to improve.     Chey Dave,

## 2020-09-23 ENCOUNTER — TELEPHONE (OUTPATIENT)
Dept: NEUROLOGY | Age: 85
End: 2020-09-23

## 2020-09-23 NOTE — TELEPHONE ENCOUNTER
Laboratory tests reviewed. INR low at 1.5. Patient called and notified to contact primary care for further adjustment in Coumadin.

## 2020-09-25 ENCOUNTER — TELEPHONE (OUTPATIENT)
Dept: NEUROLOGY | Age: 85
End: 2020-09-25

## 2020-09-25 NOTE — TELEPHONE ENCOUNTER
1. Patient was order carotid and the dx code is not medicare complaint so it will not allow them to schedule     2. Patient was ordered echo and its saying it was put in as a procedure and will not allow them the schedule    3.  Patient was ordered MRI of brain and she said that she had a CT and can not lay that long for MRI      les

## 2020-09-25 NOTE — TELEPHONE ENCOUNTER
I put a order in for the CT of the head. Please advise patient that if this is not as good of a test as an MRI and may not give us the information that we need. I also reordered the carotid duplex with new diagnosis code. I reentered the echocardiogram as well. I am not sure why this is not allowing them to schedule it. Let me know if there are any more difficulties.   Thank you

## 2020-09-25 NOTE — TELEPHONE ENCOUNTER
Tried to call patient to advise and the phone just rang and rang then asked me to enter remote access code.  Will try again later

## 2020-10-26 ENCOUNTER — TELEPHONE (OUTPATIENT)
Dept: NEUROLOGY | Age: 85
End: 2020-10-26

## 2020-10-26 NOTE — TELEPHONE ENCOUNTER
Patients daughter called states that mom was having a lot of issues over the weekend, screaming \"acting crazy\" got up and got dressed in the middle of the night and just sat waiting to go somewhere, she also states that she thought the dining room table chairs were the toilet, discussed verbally with irving who wias in office at time and advised to take her to the ER as she has a HX of TIA.

## 2020-10-29 ENCOUNTER — TELEPHONE (OUTPATIENT)
Dept: NEUROLOGY | Age: 85
End: 2020-10-29

## 2020-10-29 NOTE — TELEPHONE ENCOUNTER
Daughter took Shahzad Pedroza to the ER at the [facility] clinic and she had testing done. They think she may have dementia, they started her on atorvastin.  Daughter is calling wondering if she should be seen sooner then 12/22/2020    Please advise    Thanks Parmjit Aguirre

## 2020-11-09 ENCOUNTER — HOSPITAL ENCOUNTER (OUTPATIENT)
Dept: ULTRASOUND IMAGING | Age: 85
Discharge: HOME | End: 2020-11-11
Payer: MEDICARE

## 2020-11-09 PROCEDURE — 93880 EXTRACRANIAL BILAT STUDY: CPT

## 2020-11-13 ENCOUNTER — TELEPHONE (OUTPATIENT)
Dept: NEUROLOGY | Age: 85
End: 2020-11-13

## 2020-11-13 NOTE — TELEPHONE ENCOUNTER
Recommended OBSERVATION and MONITORING for change. Reviewed results of carotid ultrasound which showed 50 to 69% stenosis bilaterally. Patient will continue on aspirin and statin therapy. Called and discussed with daughter. We will need to monitor yearly carotid duplex.

## 2020-11-17 ENCOUNTER — TELEPHONE (OUTPATIENT)
Dept: NEUROLOGY | Age: 85
End: 2020-11-17

## 2020-11-17 RX ORDER — ATORVASTATIN CALCIUM 20 MG/1
20 TABLET, FILM COATED ORAL DAILY
Qty: 30 TABLET | Refills: 3 | Status: SHIPPED | OUTPATIENT
Start: 2020-11-17 | End: 2021-04-02

## 2020-11-17 NOTE — TELEPHONE ENCOUNTER
Patient was started on this medication while seen on 10/26/2020 at the [facility] inpatient. She was continued on this afterwards given her carotid duplex with 50 to 69% stenosis bilaterally and recent TIA. We will send over prescription for atorvastatin 20 mg nightly. Called and discussed with daughter.

## 2020-11-17 NOTE — TELEPHONE ENCOUNTER
Daughter called wanting refills on atorvastatin  and said that you suggested that patient stay on it? It was given in the hospital. I do not see it in her medication list or anywhere from  hospital. Not sure what is going on .     Please advise    Daughter arvind    [phone]

## 2020-12-16 ENCOUNTER — TELEPHONE (OUTPATIENT)
Dept: NEUROLOGY | Age: 85
End: 2020-12-16

## 2020-12-22 NOTE — TELEPHONE ENCOUNTER
New appointment on January 14th at 9:30 was scheduled, Patients daughter was called at phone number [phone] no answer. Voicemail was left, addressing new appointment and if any issues to please call the office back.

## 2021-01-08 PROBLEM — I95.1 ORTHOSTATIC HYPOTENSION: Status: ACTIVE | Noted: 2021-01-08

## 2021-02-04 ENCOUNTER — OFFICE VISIT (OUTPATIENT)
Dept: NEUROLOGY | Age: 86
End: 2021-02-04
Payer: MEDICARE

## 2021-02-04 VITALS
HEART RATE: 58 BPM | DIASTOLIC BLOOD PRESSURE: 59 MMHG | WEIGHT: 141.3 LBS | BODY MASS INDEX: 23.51 KG/M2 | SYSTOLIC BLOOD PRESSURE: 106 MMHG

## 2021-02-04 DIAGNOSIS — I95.1 ORTHOSTATIC HYPOTENSION: ICD-10-CM

## 2021-02-04 DIAGNOSIS — I65.23 BILATERAL CAROTID ARTERY STENOSIS: ICD-10-CM

## 2021-02-04 DIAGNOSIS — G45.9 TIA (TRANSIENT ISCHEMIC ATTACK): Primary | ICD-10-CM

## 2021-02-04 PROCEDURE — G8420 CALC BMI NORM PARAMETERS: HCPCS | Performed by: NURSE PRACTITIONER

## 2021-02-04 PROCEDURE — 1090F PRES/ABSN URINE INCON ASSESS: CPT | Performed by: NURSE PRACTITIONER

## 2021-02-04 PROCEDURE — 1036F TOBACCO NON-USER: CPT | Performed by: NURSE PRACTITIONER

## 2021-02-04 PROCEDURE — 4040F PNEUMOC VAC/ADMIN/RCVD: CPT | Performed by: NURSE PRACTITIONER

## 2021-02-04 PROCEDURE — 99214 OFFICE O/P EST MOD 30 MIN: CPT | Performed by: NURSE PRACTITIONER

## 2021-02-04 PROCEDURE — G8427 DOCREV CUR MEDS BY ELIG CLIN: HCPCS | Performed by: NURSE PRACTITIONER

## 2021-02-04 PROCEDURE — 1123F ACP DISCUSS/DSCN MKR DOCD: CPT | Performed by: NURSE PRACTITIONER

## 2021-02-04 PROCEDURE — G8484 FLU IMMUNIZE NO ADMIN: HCPCS | Performed by: NURSE PRACTITIONER

## 2021-02-04 RX ORDER — LEVOTHYROXINE SODIUM 0.03 MG/1
TABLET ORAL
COMMUNITY
Start: 2020-12-03 | End: 2021-02-04

## 2021-02-04 ASSESSMENT — ENCOUNTER SYMPTOMS
VOMITING: 0
WHEEZING: 0
COUGH: 0
NAUSEA: 0
TROUBLE SWALLOWING: 0
COLOR CHANGE: 0
CHEST TIGHTNESS: 0
SHORTNESS OF BREATH: 0

## 2021-02-04 NOTE — PROGRESS NOTES
Subjective:      Patient ID: Alejandro Irving is a 80 y.o. female who presents today for:  Chief Complaint   Patient presents with    Follow-up     Patient states that she hasn't been doing good. She says she has fallen three times sicne seen last seen, one of the last times she fell she cut her head open and had to get stitches.  Results     Daughter says she had testing done in the hospital at [address] [place] [address], but does not know exactly what was done. HPI  Pt seen and examined in the office for follow-up for TIA and orthostatic hypotension. 2/4/2021:  Patient seen today for follow-up for TIA and orthostatic hypotension. She was last seen on 9/22/2020. At that time she was reporting episodes of expressive aphasia. She was initiated on aspirin and statin therapy and outpatient work-up was ordered including CT of the head (patient cannot tolerate MRI and declined to have it done) which was not done. Carotid duplex Which was done on 11/9/2020 and showed 50 to 69% stenosis bilaterally, lipid panel which showed LDL of 43, hemoglobin A1c 5.7, vitamin B12 and folate within normal limits, CBC normal, and unremarkable CMP. Patient remained on Coumadin therapy for history of bilateral PEs. Since then patient has been seen and treated at the [facility] 3x, 1 time requiring hospitalization on 10/26/2020 for TIA. No acute infarct was identified. Patient was continued on aspirin and statin therapy. There was concern for dementia. Patient denies any recurrent TIA symptoms since hospital discharge. Patient presents today accompanied by her daughter. She is alert and oriented x3, no acute distress, cooperative. She reports she is fallen several times at home. She is unsure of nature of falls. She states she will just wake up on the floor. She denies any known preceding dizziness, chest pain or pressure, palpitations. No seizure activity reported.   Patient is known to have orthostatic hypotension and blood pressures were checked again today and there is a drop from 710 systolic to 321 systolic going from lying to standing. Patient has not been taking her midodrine. She does report daily compliance with her Coumadin and aspirin and atorvastatin. No unusual signs and symptoms of bleeding or bruising. Had INR checked today but do not have results available. 9/22/20:  Patient seen and examined for 3-month follow-up visit for mild cognitive impairment and orthostatic hypotension. Patient accompanied to her appointment today by her . Patient reports that approximately 1 week ago she had an episode where she could not speak that lasted approximately 6 hours. She denied any accompanying headache, double vision, facial droop, dysphasia, one-sided weakness, paresthesias, chest pain or pressure or palpitations. She was alert and aware of what was happening during the episode. There is no reports of seizure activity or loss of bowel and bladder. She denied any recent fall with head trauma. Patient actually states that she has been doing better and has not fallen since our last visit. Denies dizziness. Patient did not go to the emergency room to get evaluated for this. She is currently on Coumadin which she states is for history of pulmonary embolism but is not sure when she had her last INR checked. She is not on statin therapy or aspirin. Denies history of CVA. Currently alert and oriented x3, no acute distress, cooperative.   No focal deficits appreciated.        7/1/20:   Pt seen and examined in the office to establish care for memory impairment.  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  Patient presents today accompanied by daughter for concerns of memory loss. Lavern Erwin reports that her memory has been declining over the last several years this is been gradual in nature.  Patient denies any history of CVA, seizure, remote head trauma, CNS infection.  Patient does report some difficulty retaining new information and word finding issues that are occasional and not on a daily basis. Georgia Leung is still able to perform all of her own ADLs including cooking and managing finances without difficulty.  Patient no longer drives because she feels \"foggy in my head\" and is concerned she might get into a car accident. Lavern Erwin denies any mood changes, agitation, behavior changes, hallucinations.  No recent illness.  No new medications.  Patient does report frequent insomnia and snoring at night and excessive daytime sleepiness.  She denies anxiety or depression or excessive stress.  She denies alcohol or illicit drug use.  No tobacco use.  Patient is without trauma or bradykinesia.  She uses walker to ambulate and gait is unsteady at times.  Patient reports 6 falls over the last 6 months.  She describes them as mechanical in nature but her history of events is unclear.  She states that she has hit her head several times with fall.  She reports she had a recent CT of the brain done approximately 2 to 3 weeks ago.  No results available for review today.  She denies any urinary incontinence, headaches, diplopia, dysarthria, dysphasia, one-sided weakness, chest pain, palpitations.  She does have occasional shortness of breath.  Does not wear oxygen.   Currently alert and oriented x3, no acute distress, cooperative.  No focal deficits.  MMSE score 28 out of 30  Past Medical History:   Diagnosis Date    Carotid stenosis 11/13/2020    50 to 69% bilateral    COPD (chronic obstructive pulmonary disease) (HCC)     Current use of long term anticoagulation     Diabetes mellitus ([facility] [address]     Dysphagia     Follicular thyroid cancer ([facility] [address]     HLD (hyperlipidemia)  HTN (hypertension)     PE (pulmonary thromboembolism) (HCC)     Pulmonary nodules     Vocal cord paralysis      Past Surgical History:   Procedure Laterality Date    THYROIDECTOMY       Social History     Socioeconomic History    Marital status:      Spouse name: Not on file    Number of children: Not on file    Years of education: Not on file    Highest education level: Not on file   Occupational History    Not on file   Social Needs    Financial resource strain: Not on file    Food insecurity     Worry: Not on file     Inability: Not on file    Transportation needs     Medical: Not on file     Non-medical: Not on file   Tobacco Use    Smoking status: Never Smoker    Smokeless tobacco: Never Used   Substance and Sexual Activity    Alcohol use: Not on file    Drug use: Not on file    Sexual activity: Not on file   Lifestyle    Physical activity     Days per week: Not on file     Minutes per session: Not on file    Stress: Not on file   Relationships    Social connections     Talks on phone: Not on file     Gets together: Not on file     Attends Sikhism service: Not on file     Active member of club or organization: Not on file     Attends meetings of clubs or organizations: Not on file     Relationship status: Not on file    Intimate partner violence     Fear of current or ex partner: Not on file     Emotionally abused: Not on file     Physically abused: Not on file     Forced sexual activity: Not on file   Other Topics Concern    Not on file   Social History Narrative    Not on file     No family history on file.   Allergies   Allergen Reactions    Ambien [Zolpidem Tartrate] Other (See Comments)     Makes her \"crazy\"     Current Outpatient Medications on File Prior to Visit   Medication Sig Dispense Refill    atorvastatin (LIPITOR) 20 MG tablet Take 1 tablet by mouth daily 30 tablet 3    aspirin (ASPIRIN CHILDRENS) 81 MG chewable tablet Take 1 tablet by mouth daily 30 tablet 3  levothyroxine (SYNTHROID) 75 MCG tablet Take 75 mcg by mouth daily      propranolol (INDERAL) 40 MG tablet Take 20 mg by mouth 2 times daily      warfarin (COUMADIN) 2.5 MG tablet Take as directed by [facility]. Quantity equals 90 day supply. 100 tablet 1    warfarin (COUMADIN) 5 MG tablet TAKE 1 TABLET DAILY 90 tablet 2    simvastatin (ZOCOR) 40 MG tablet Take 40 mg by mouth nightly.  albuterol sulfate  (90 Base) MCG/ACT inhaler 2 puffs by Other route every 6 hours as needed      umeclidinium-vilanterol (ANORO ELLIPTA) 62.5-25 MCG/INH AEPB inhaler Inhale into the lungs daily      midodrine (PROAMATINE) 2.5 MG tablet Take 1 tablet by mouth 3 times daily (Patient not taking: Reported on 9/22/2020) 90 tablet 3    glimepiride (AMARYL) 2 MG tablet Take 2 mg by mouth every morning (before breakfast).  oxycodone-acetaminophen (PERCOCET) 5-325 MG per tablet Take 1 tablet by mouth every 4 hours as needed. No current facility-administered medications on file prior to visit. Review of Systems   Constitutional: Negative for appetite change, chills, fatigue and fever. HENT: Negative for hearing loss and trouble swallowing. Eyes: Negative for visual disturbance. Respiratory: Negative for cough, chest tightness, shortness of breath and wheezing. Cardiovascular: Negative for chest pain, palpitations and leg swelling. Gastrointestinal: Negative for nausea and vomiting. Genitourinary: Negative for difficulty urinating. Musculoskeletal: Positive for arthralgias and gait problem. Skin: Negative for color change and rash. Neurological: Negative for dizziness, tremors, seizures, syncope, facial asymmetry, speech difficulty, weakness, light-headedness, numbness and headaches. Psychiatric/Behavioral: Positive for confusion (  Forgetful). Negative for agitation and hallucinations. The patient is not nervous/anxious.         Objective: /62 (Site: Right Upper Arm, Position: Sitting, Cuff Size: Medium Adult)   Pulse 58   Wt 141 lb 4.8 oz (64.1 kg)   BMI 23.51 kg/m²     Physical Exam  Vitals signs and nursing note reviewed. Constitutional:       General: She is not in acute distress. Appearance: She is not diaphoretic. HENT:      Head: Normocephalic and atraumatic. Eyes:      Extraocular Movements: Extraocular movements intact. Pupils: Pupils are equal, round, and reactive to light. Cardiovascular:      Rate and Rhythm: Normal rate and regular rhythm. Pulmonary:      Effort: Pulmonary effort is normal. No respiratory distress. Breath sounds: Normal breath sounds. Abdominal:      General: Bowel sounds are normal.      Palpations: Abdomen is soft. Skin:     General: Skin is warm and dry. Neurological:      Mental Status: She is alert and oriented to person, place, and time. Cranial Nerves: No cranial nerve deficit. Motor: Weakness (  Generalized) present. No tremor or seizure activity. Coordination: Romberg sign negative. Gait: Gait abnormal.         Us Carotid Artery Bilateral    Result Date: 11/10/2020  EXAMINATION: US CAROTID ARTERY BILATERAL HISTORY:  I65.23 Bilateral carotid artery stenosis ICD10. History of hypertension, hyperlipidemia, TIA, dysphasia, bilateral carotid bruits. COMPARISON:5/10/2012 TECHNIQUE: Carotid duplex sonograms which include gray scale and color flow evaluation are complimented with spectral waveform analysis. Please refer to chart below for specific carotid velocity measurements. The degree of stenosis recorded on this exam uses the same method of stratification used in the NASCET trials. This complies with ACR practice guidelines and the Society of radiology in ultrasound consensus statement and provides adequate information for clinical decision making.  Society of Radiologists in Ultrasound (SRU) consensus statement was used to estimate internal carotid artery stenosis. RESULT: There is some plaque at the right carotid bifurcation. There is plaque at the left carotid bifurcation. There is increased velocity in both ICAs, see below. There is antegrade flow identified in both vertebral arteries. ARTERIAL BLOOD FLOW VELOCITY RIGHT PEAK SYSTOLIC VELOCITIES (PSV)                                               Prox CCA    65 cm/s             Mid CCA     65 cm/s              Dist CCA    54 cm/s              Prox ICA    86 cm/s               Mid ICA     179 cm/s            Dist ICA    198 cm/s             Prox ECA    62 cm/s             Prox VERT   88 cm/s              ICA/CCA     3.0                        LEFT PEAK SYSTOLIC VELOCITIES (PSV)  Prox CCA    109 cm/s Mid CCA     82 cm/s Dist CCA    69 cm/s Prox ICA    102 cm/s Mid ICA     144 cm/s Dist ICA    206 cm/s Prox ECA    97 cm/s Prox VERT   93 cm/s ICA/CCA     2.7      50-69 % STENOSIS IN THE RIGHT ICA  50-69  % STENOSIS IN THE LEFT ICA ANTEGRADE FLOW WITHIN BOTH VERTEBRAL ARTERIES.        Lab Results   Component Value Date    WBC 6.9 09/22/2020    RBC 4.49 09/22/2020    RBC 3.15 06/06/2012    HGB 12.9 09/22/2020    HCT 39.6 09/22/2020    MCV 88.1 09/22/2020    MCH 28.8 09/22/2020    MCHC 32.6 09/22/2020    RDW 14.7 09/22/2020     09/22/2020    MPV 7.4 08/16/2012     Lab Results   Component Value Date     09/22/2020    K 4.2 09/22/2020     09/22/2020    CO2 24 09/22/2020    BUN 31 09/22/2020    CREATININE 0.78 09/22/2020    GFRAA >60.0 09/22/2020    LABGLOM >60.0 09/22/2020    GLUCOSE 121 09/22/2020    GLUCOSE 90 05/25/2012    PROT 6.0 09/22/2020    LABALBU 3.6 09/22/2020    LABALBU 4.0 03/22/2012    CALCIUM 9.2 09/22/2020    BILITOT 0.5 09/22/2020    ALKPHOS 129 09/22/2020    AST 14 09/22/2020    ALT 11 09/22/2020     Lab Results   Component Value Date    PROTIME 17.8 09/22/2020    PROTIME 46.4 09/26/2018    PROTIME 28.1 09/29/2016    INR 1.5 09/22/2020     Lab Results   Component Value Date    TSH 21.143 08/16/2012    AECDZHNU98 284 09/22/2020    FOLATE 16.0 09/22/2020     Lab Results   Component Value Date    TRIG 144 09/22/2020    HDL 45 09/22/2020    LDLCALC 43 09/22/2020     No results found for: Jannine Ice, LABBENZ, CANNAB, COCAINESCRN, LABMETH, OPIATESCREENURINE, PHENCYCLIDINESCREENURINE, PPXUR, ETOH  No results found for: LITHIUM, DILFRTOT, VALPROATE    Assessment and Plan:      1. TIA (transient ischemic attack)  -Stable  -Continue aspirin and statin therapy  -Discussed risk factor modification    2. Orthostatic hypotension  -Patient noted to be orthostatic today. She is not taking her midodrine. Advised her to reinitiate this. Advised patient that her recurrent falls could be due to the fact that her blood pressure is dropping and she is passing out. 3. Bilateral carotid artery stenosis  -Continue aspirin and statin therapy and obtain yearly carotid duplex      Return in about 6 months (around 8/4/2021), or if symptoms worsen or fail to improve.     DEMARCO Crooks - CNP     Collaborating Physician Dr Karin Feliz

## 2021-04-02 RX ORDER — ATORVASTATIN CALCIUM 20 MG/1
TABLET, FILM COATED ORAL
Qty: 30 TABLET | Refills: 3 | Status: SHIPPED | OUTPATIENT
Start: 2021-04-02

## 2021-04-09 DIAGNOSIS — I95.1 ORTHOSTATIC HYPOTENSION: ICD-10-CM

## 2021-04-12 RX ORDER — MIDODRINE HYDROCHLORIDE 2.5 MG/1
2.5 TABLET ORAL 3 TIMES DAILY
Qty: 90 TABLET | Refills: 3 | Status: SHIPPED | OUTPATIENT
Start: 2021-04-12